# Patient Record
Sex: MALE | Race: WHITE | NOT HISPANIC OR LATINO | Employment: OTHER | URBAN - METROPOLITAN AREA
[De-identification: names, ages, dates, MRNs, and addresses within clinical notes are randomized per-mention and may not be internally consistent; named-entity substitution may affect disease eponyms.]

---

## 2017-07-07 PROBLEM — D64.9 ANEMIA: Status: ACTIVE | Noted: 2017-07-07

## 2017-07-07 PROBLEM — T56.0X1A: Status: ACTIVE | Noted: 2017-07-07

## 2017-07-07 PROBLEM — E66.9 OBESITY (BMI 30-39.9): Status: ACTIVE | Noted: 2017-07-07

## 2017-07-07 PROBLEM — M1A.10X0: Status: ACTIVE | Noted: 2017-07-07

## 2017-07-25 ENCOUNTER — TELEPHONE (OUTPATIENT)
Dept: CARDIOLOGY | Facility: MEDICAL CENTER | Age: 72
End: 2017-07-25

## 2017-07-25 DIAGNOSIS — I15.9 SECONDARY HYPERTENSION: ICD-10-CM

## 2017-07-25 DIAGNOSIS — E78.5 HYPERLIPIDEMIA, UNSPECIFIED HYPERLIPIDEMIA TYPE: ICD-10-CM

## 2017-07-25 DIAGNOSIS — R00.2 PALPITATIONS: ICD-10-CM

## 2017-07-25 NOTE — TELEPHONE ENCOUNTER
Has labs from June but is due for echo - orders placed and message sent to scheduling to get patient scheduled before 8/9 f/u with EC

## 2017-07-25 NOTE — TELEPHONE ENCOUNTER
----- Message from Yaquelin Aguirre [facility] sent at 7/25/2017  1:02 PM PDT -----  Regarding: RE: Surgical clearance needed asap  No pt hasn't been seen since 2015 scheduled appt and [facility] to let [facility] know.  I think I left a message on your voicemail.      ----- Message -----     From: Shahzad Erwin     Sent: 7/25/2017  12:06 PM       To: Easton Brambila Ass't  Subject: FW: Surgical clearance needed asap               Did he sign this?     ----- Message -----     From: Ellen Nicole     Sent: 7/25/2017   9:33 AM       To: Shahzad Erwin  Subject: Surgical clearance needed asap                   [facility]/Breezy    Please call Nay at [facility] at [phone] asap. Patient is scheduled for Colonoscopy on Thursday, 7//27 and she needs to find out today about holding patient's Xarelto for 2 days.

## 2017-08-02 ENCOUNTER — PATIENT OUTREACH (OUTPATIENT)
Dept: HEALTH INFORMATION MANAGEMENT | Facility: OTHER | Age: 72
End: 2017-08-02

## 2017-08-02 NOTE — PROGRESS NOTES
Attempt #:1    WebIZ Checked & Epic Updated: no  HealthConnect Verified: no  Verify PCP: yes    Communication Preference Obtained: yes     Review Care Team: yes    Annual Wellness Visit Scheduling  1. Scheduling Status already scheduled        Care Gap Scheduling (Attempt to Schedule EACH Overdue Care Gap!)     Health Maintenance Due   Topic Date Due   • IMM DTaP/Tdap/Td Vaccine (1 - Tdap) cvmc   • IMM ZOSTER VACCINE  cvmc   • COLONOSCOPY  cvmc         MyChart Activation: declined  MyChart Estephania: no  Virtual Visits: no  Opt In to Text Messages: no

## 2017-08-09 ENCOUNTER — OFFICE VISIT (OUTPATIENT)
Dept: CARDIOLOGY | Facility: CLINIC | Age: 72
End: 2017-08-09
Payer: MEDICARE

## 2017-08-09 VITALS
HEART RATE: 56 BPM | BODY MASS INDEX: 33.79 KG/M2 | DIASTOLIC BLOOD PRESSURE: 84 MMHG | HEIGHT: 70 IN | OXYGEN SATURATION: 92 % | SYSTOLIC BLOOD PRESSURE: 132 MMHG | WEIGHT: 236 LBS

## 2017-08-09 DIAGNOSIS — Z79.01 CHRONIC ANTICOAGULATION: ICD-10-CM

## 2017-08-09 DIAGNOSIS — Z01.810 PRE-OPERATIVE CARDIOVASCULAR EXAMINATION: ICD-10-CM

## 2017-08-09 DIAGNOSIS — I27.20 PULMONARY HTN (HCC): ICD-10-CM

## 2017-08-09 DIAGNOSIS — E78.5 HYPERLIPIDEMIA, UNSPECIFIED HYPERLIPIDEMIA TYPE: ICD-10-CM

## 2017-08-09 DIAGNOSIS — R06.09 DOE (DYSPNEA ON EXERTION): ICD-10-CM

## 2017-08-09 DIAGNOSIS — D50.9 IRON DEFICIENCY ANEMIA, UNSPECIFIED IRON DEFICIENCY ANEMIA TYPE: ICD-10-CM

## 2017-08-09 DIAGNOSIS — I48.19 PERSISTENT ATRIAL FIBRILLATION (HCC): ICD-10-CM

## 2017-08-09 DIAGNOSIS — I10 ESSENTIAL HYPERTENSION: ICD-10-CM

## 2017-08-09 DIAGNOSIS — R00.2 PALPITATIONS: ICD-10-CM

## 2017-08-09 LAB — EKG IMPRESSION: NORMAL

## 2017-08-09 PROCEDURE — 99214 OFFICE O/P EST MOD 30 MIN: CPT | Performed by: INTERNAL MEDICINE

## 2017-08-09 PROCEDURE — 93000 ELECTROCARDIOGRAM COMPLETE: CPT | Performed by: INTERNAL MEDICINE

## 2017-08-09 NOTE — MR AVS SNAPSHOT
"        Michael Carrero   2017 8:20 AM   Office Visit   MRN: [id number]    Department:  [facility]   Dept Phone:  [phone]    Description:  Male : 1945   Provider:  Nani Carr MD,[facility]           Reason for Visit     Follow-Up           Allergies as of 2017     No Known Allergies      You were diagnosed with     Pre-operative cardiovascular examination   [V72.81.ICD-9-CM]       Hyperlipidemia, unspecified hyperlipidemia type   [6164849]       Palpitations   [785.1.ICD-9-CM]       PHILLIPS (dyspnea on exertion)   [484038]       Pulmonary HTN (CMS-HCC)   [812796]       Persistent atrial fibrillation (CMS-HCC)   [879971]       Chronic anticoagulation   [789377]       Essential hypertension   [8346890]       Iron deficiency anemia, unspecified iron deficiency anemia type   [1242595]         Vital Signs     Blood Pressure Pulse Height Weight Body Mass Index Oxygen Saturation    132/84 mmHg 56 1.778 m (5' 10\") 107.049 kg (236 lb) 33.86 kg/m2 92%    Smoking Status                   Former Smoker           Basic Information     Date Of Birth Sex Race Ethnicity Preferred Language    1945 Male White Non- English      Your appointments     2017  8:00 AM   FOLLOW UP with [facility] Carr MD,[facility] ([facility])    [address] [place] [id number] [phone]              Problem List              ICD-10-CM Priority Class Noted - Resolved    HTN (hypertension) I10   2013 - Present    Hyperlipidemia E78.5   2013 - Present    Prostate cancer screening Z12.5   2013 - Present    Palpitations R00.2   2013 - Present    Skin abnormality L98.9   10/23/2013 - Present    Colon cancer screening Z12.11   2013 - Present    Hypokalemia E87.6   2014 - Present    Abnormal EKG R94.31   2014 - Present    Left knee pain M25.562   9/15/2014 - Present    Gout M10.9   9/15/2014 - Present   " Abdominal pain R10.9   1/5/2015 - Present    Atrial fibrillation (CMS-HCC) I48.91   5/26/2015 - Present    Anemia D64.9   7/7/2017 - Present    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, M1A.10X0   7/7/2017 - Present    Obesity (BMI 30-39.9) E66.9   7/7/2017 - Present      Health Maintenance        Date Due Completion Dates    IMM DTaP/Tdap/Td Vaccine (1 - Tdap) 8/18/1964 ---    IMM ZOSTER VACCINE 8/18/2005 ---    COLONOSCOPY 6/1/2017 6/1/2007 (N/S)    Override on 6/1/2007: (N/S)    IMM INFLUENZA (1) 9/1/2017 10/1/2016, 1/5/2015, 1/1/2013            Results       Current Immunizations     13-VALENT PCV PREVNAR 7/5/2016  8:06 AM    Influenza TIV (IM) 1/1/2013    Influenza Vaccine Adult HD 10/1/2016    Influenza Vaccine Quad Inj (Preserved) 1/5/2015    Pneumococcal polysaccharide vaccine (PPSV-23) 6/17/2013      Below and/or attached are the medications your provider expects you to take. Review all of your home medications and newly ordered medications with your provider and/or pharmacist. Follow medication instructions as directed by your provider and/or pharmacist. Please keep your medication list with you and share with your provider. Update the information when medications are discontinued, doses are changed, or new medications (including over-the-counter products) are added; and carry medication information at all times in the event of emergency situations     Allergies:  No Known Allergies          Medications  Valid as of: August 09, 2017 -  8:46 AM    Generic Name Brand Name Tablet Size Instructions for use    Allopurinol (Tab) ZYLOPRIM 300 MG TAKE 1 TABLET DAILY        Carvedilol (Tab) COREG 25 MG TAKE 1 TABLET TWICE A DAY WITH MEALS        Colesevelam HCl (Tab) WELCHOL 625 MG TAKE 3 TABLETS TWICE A DAY WITH MEALS        Fenofibrate (Tab) TRICOR 145 MG TAKE 1 TABLET DAILY        Furosemide (Tab) LASIX 20 MG TAKE 1 TABLET TWICE A DAY        Ibuprofen (Tab) MOTRIN 800 MG TAKE 1 TABLET EVERY 6 HOURS AS  NEEDED        Oxycodone-Acetaminophen (Tab) PERCOCET 5-325 MG Take 1-2 Tabs by mouth every four hours as needed for Severe Pain.        Potassium Chloride Brianda CR (Tab CR) Kdur 20 MEQ TAKE 1 TABLET DAILY        Rivaroxaban (Tab) XARELTO 15 MG TAKE 1 TABLET WITH DINNER        Rosuvastatin Calcium (Tab) CRESTOR 5 MG TAKE 1 TABLET EVERY EVENING        Valsartan-Hydrochlorothiazide (Tab) DIOVAN--25 MG TAKE 1 TABLET DAILY        .                 Medicines prescribed today were sent to:     [facility] - [place], [place] - [address] [facility]    [address] [facility] [address]    Phone: [phone] Fax: [phone]    Open 24 Hours?: No    [id number] - [place], [place] - [id number] [id number]  [id number] [facility] [address]    Phone: [phone] Fax: [phone]    Open 24 Hours?: No      Medication refill instructions:       If your prescription bottle indicates you have medication refills left, it is not necessary to call your provider’s office. Please contact your pharmacy and they will refill your medication.    If your prescription bottle indicates you do not have any refills left, you may request refills at any time through one of the following ways: The online [employer] system (except Urgent Care), by calling your provider’s office, or by asking your pharmacy to contact your provider’s office with a refill request. Medication refills are processed only during regular business hours and may not be available until the next business day. Your provider may request additional information or to have a follow-up visit with you prior to refilling your medication.   *Please Note: Medication refills are assigned a new Rx number when refilled electronically. Your pharmacy may indicate that no refills were authorized even though a new prescription for the same medication is available at the pharmacy. Please request the medicine by name with the  pharmacy before contacting your provider for a refill.           MyChart Status: Patient Declined

## 2017-08-09 NOTE — PROGRESS NOTES
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 07/07/2017   • Obesity (BMI 30-39.9) 07/07/2017   • Atrial fibrillation (CMS-HCC) 05/26/2015   • Abdominal pain 01/05/2015   • Left knee pain 09/15/2014   • Gout 09/15/2014   • Abnormal EKG 06/11/2014   • Hypokalemia 06/04/2014   • Colon cancer screening 12/11/2013   • Skin abnormality 10/23/2013   • HTN (hypertension) 06/17/2013   • Hyperlipidemia 06/17/2013   • Prostate cancer screening 06/17/2013   • Palpitations 06/17/2013       Past Medical History   Diagnosis Date   • History of 24 hour EKG monitoring 2010   • H/O chest x-ray 2010   • 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 at [facility]     Social History     Social History   • Marital Status:      Spouse Name: N/A   • Number of Children: N/A   • Years of Education: N/A     Social History Main Topics   • Smoking status: Former Smoker     Types: Cigarettes   • Smokeless tobacco: Never Used   • Alcohol Use: 0.0 oz/week     0 Standard drinks or equivalent per week   • Drug Use: No   • Sexual Activity: Not Asked     Other Topics Concern   • None     Social History Narrative      History reviewed. No pertinent family history.    Current Outpatient Prescriptions   Medication Sig Dispense Refill   • rosuvastatin (CRESTOR) 5 MG Tab TAKE 1 TABLET EVERY EVENING 90 Tab 3   • WELCHOL 625 MG Tab TAKE 3 TABLETS TWICE A DAY WITH MEALS 540 Tab 1   • furosemide (LASIX) 20 MG Tab TAKE 1 TABLET TWICE A  Tab 2   • ibuprofen (MOTRIN) 800 MG Tab TAKE 1 TABLET EVERY 6 HOURS AS NEEDED 270 Tab 2   • potassium chloride SA (KDUR) 20 MEQ Tab CR TAKE 1 TABLET DAILY 90 Tab 2   • XARELTO 15 MG Tab tablet TAKE 1 TABLET WITH DINNER 90 Tab 3   • carvedilol (COREG) 25 MG Tab TAKE 1 TABLET TWICE A DAY WITH MEALS 180 Tab 3   • valsartan-hydrochlorothiazide (DIOVAN-HCT) 320-25 MG per tablet TAKE 1 TABLET DAILY 90 Tab 3   • allopurinol (ZYLOPRIM) 300 MG Tab TAKE 1 TABLET DAILY 90 Tab 3   • fenofibrate (TRICOR) 145 MG Tab TAKE 1 TABLET DAILY 90 Tab 3   • oxycodone-acetaminophen (PERCOCET) 5-325 MG TABS Take 1-2 Tabs by mouth every four hours as needed for Severe Pain. 30 Tab 0     No current facility-administered medications for this visit.     Review of patient's allergies indicates no known allergies.    Review of Systems:     Constitutional: Denies fevers, Denies weight changes  Eyes: Denies changes in vision, no eye pain  Ears/Nose/Throat/Mouth: Denies nasal congestion or sore throat   Cardiovascular: Denies chest pain or palpitations   Respiratory: PIHLLIPS,  shortness of breath , Denies cough  Gastrointestinal/Hepatic: Denies abdominal pain, nausea, vomiting, diarrhea, constipation or GI bleeding   Genitourinary: Denies bladder dysfunction, dysuria or frequency  Musculoskeletal/Rheum: Denies  joint pain and swelling   Skin/Breast: Denies rash, denies breast lumps or discharge  Neurological: Denies headache, confusion, memory loss or focal weakness/parasthesias  Psychiatric: denies mood disorder   Endocrine: denies hx of diabetes or thyroid dysfunction  Heme/Oncology/Lymph Nodes: Denies enlarged lymph nodes, denies  "brusing or known bleeding disorder  Allergic/Immunologic: Denies hx of allergies      All other systems were reviewed and are negative (AMA/CMS criteria)      Blood pressure 132/84, pulse 56, height 1.778 m (5' 10\"), weight 107.049 kg (236 lb), SpO2 92 %.  General Appearance:  Obese;  Well developed, Well nourished, No acute distress, Non-toxic appearance.   HENT:  Normocephalic, Atraumatic, Oropharynx moist mucous membranes, Dentition: , Nose normal.    Eyes:   EOMI, Conjunctiva normal, No discharge. RT blind  Neck:  Normal range of motion, No cervical tenderness, Supple, No stridor, No JVD .  No thyromegaly.  No carotid bruit.  Cardiovascular:  Normal heart rate, IRR rhythm, NL but distant S1, S2, no S3,but S4; No gallops;  No rubs, 2/6 MALIK at MLSB; .   Extremitites with intact distal pulses, no  clubbing or edema.  No heaves, thrills, HJR;  Peripheral pulses: carotid 2+, brachial 2+, radial 2+, ulnar 2+, femoral 1+, popliteal 1+, PT 0.5+, DP 0.5+;  Lungs:  Respiratory effort is normal. Diminished breath sounds, breath sounds clear to auscultation bilaterally,  no rales, no rhonchi, no wheezing.   Abdomen: Large; Bowel sounds normal, Soft, No tenderness, No guarding, No rebound, No masses, No hepatosplenomegaly.  Skin: Warm, Dry, No erythema, No rash, no induration or crepitus. Multiple Lipofibroma.  Neurologic: Alert & oriented x 3, Normal motor function, Normal sensory function, No focal deficits noted, cranial nerves II through XII are normal,  normal gait.  Psychiatric: Affect normal, Judgment normal, Mood normal.    Results for AMI MICHAELS (MRN [id number]) as of 8/9/2017 08:13   Ref. Range 6/15/2017 07:05 7/7/2017 08:35 7/12/2017 17:41   WBC Latest Ref Range: 4.8-10.8 K/uL 8.3     RBC Latest Ref Range: 4.70-6.10 M/uL 5.21     Hemoglobin Latest Ref Range: 14.0-18.0 g/dL 11.7 (L)     Hematocrit Latest Ref Range: 42.0-52.0 % 40.7 (L)     MCV Latest Ref Range: 80.0-94.0 fL 78.1 (L)     MCH Latest Ref Range: " 27.0-31.0 pg 22.5 (L)     MCHC Latest Ref Range: 33.0-37.0 g/dL 28.7 (L)     RDW Latest Ref Range: 11.5-14.5 % 18.8 (H)     Platelet Count Latest Ref Range: 130-400 K/uL 292     MPV Latest Ref Range: 7.4-10.4 fL 9.7     Sodium Latest Ref Range: 136-145 mmol/L 140     Potassium Latest Ref Range: 3.5-5.1 mmol/L 4.0     Chloride Latest Ref Range:  mmol/L 104     Co2 Latest Ref Range: 21-32 mmol/L 25     Anion Gap Latest Ref Range: 10-18 mmol/L 15     Glucose Latest Ref Range: 74-99 mg/dL 106 (H)     Bun Latest Ref Range: 7-18 mg/dL 23 (H)     Creatinine Latest Ref Range: 0.8-1.3 mg/dL 1.4 (H)     GFR If  Latest Ref Range: >60 mL/min/1.73 m 2 >60     GFR If Non  Latest Ref Range: >60 mL/min/1.73 m 2 50 (A)     Calcium Latest Ref Range: 8.5-11.0 mg/dL 9.7     AST(SGOT) Latest Ref Range: 15-37 U/L 23     ALT(SGPT) Latest Ref Range: 12-78 U/L 20     Alkaline Phosphatase Latest Ref Range:  U/L 23 (L)     Total Bilirubin Latest Ref Range: 0.2-1.0 mg/dL 0.5     Albumin Latest Ref Range: 3.4-5.0 g/dL 4.0     Total Protein Latest Ref Range: 6.4-8.2 g/dL 7.6     A-G Ratio Unknown 1.1     Uric Acid Latest Ref Range: 3.5-8.5 mg/dL  5.1    Iron Latest Ref Range:  ug/dL  45 (L)    Total Iron Binding Latest Ref Range: 250-450 ug/dL  620 (H)    % Saturation Latest Ref Range: 20-55 %  7 (L)    Cholesterol,Tot Latest Ref Range: 120-200 mg/dL 141     Triglycerides Latest Ref Range: 0-150 mg/dL 184 (H)     HDL Latest Ref Range: 40.0-60.0 mg/dL 26.0 (L)     Non HDL Cholesterol Latest Ref Range:   115     LDL Latest Ref Range: <100 mg/dL 78     Chol-Hdl Ratio Unknown 5.42     POC Fecal Occult Blood Latest Ref Range: Negative    1   Vitamin B12 -True Cobalamin Latest Ref Range: 211-911 pg/mL  551    Ferritin Latest Ref Range: 22.0-322.0 ng/mL  11.6 (L)    Folate -Folic Acid Latest Ref Range: >3.1 ng/mL  >24.00    Prostatic Specific Antigen Tot Latest Ref Range: 0.00-4.00 ng/mL 2.58        6/2014 Stress test:  1.  No evidence for Lexiscan induced ischemia or infarction.  2. Mild hypokinesia involving the anterior, lateral and septal walls with global reduced wall thickening.  3.  Ejection fraction 44%.    Assessment and Plan.   71 y.o. male has Echo showing LVEF 55% with RVSP 66 mmHg, mild AR, MR and mild to moderate TR; Reviewed and discussed;  He is low CV risk but persistent AFib on Xarelto for colonoscopy; Rec: Off Xarelto for 2 ds for procedure on 2nd day, possible LMWH, Lovenox,  for coverage for bridging, 1mg/Kg Sub Q bid;     1. Pre-operative cardiovascular examination  See above    2. Hyperlipidemia, unspecified hyperlipidemia type  recheck    3. Palpitations  gone    4. PHILLIPS (dyspnea on exertion)  stable    5. Pulmonary HTN (CMS-HCC)  Reviewed and to follow; Long discussion about possible sx's;     6. Persistent atrial fibrillation (CMS-HCC)  discussed    7. Chronic anticoagulation  discussed    8. Essential hypertension  controlled    9. Iron deficiency anemia, unspecified iron deficiency anemia type  See w/u and endoscopy      Return to clinic in  1  months    1. Pre-operative cardiovascular examination     2. Hyperlipidemia, unspecified hyperlipidemia type     3. Palpitations     4. PHILLIPS (dyspnea on exertion)     5. Pulmonary HTN (CMS-HCC)     6. Persistent atrial fibrillation (CMS-HCC)     7. Chronic anticoagulation     8. Essential hypertension     9. Iron deficiency anemia, unspecified iron deficiency anemia type

## 2017-08-09 NOTE — Clinical Note
[facility] [place] [address],   [address]nd Floor  [place] [address]  Phone: [phone]  Fax: [phone]              Michael Carrero  1945    Encounter Date: 8/9/2017    Nolberto Lou M.D.    Thank you for the referral. I had the pleasure of seeing Michael Carrero today in cardiology clinic. I've attached my visit note below. If you have any questions please feel free to give me a call anytime.      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 07/07/2017   • Obesity (BMI 30-39.9) 07/07/2017   • Atrial fibrillation (CMS-HCC) 05/26/2015   • Abdominal pain 01/05/2015   • Left knee pain 09/15/2014   • Gout 09/15/2014   • Abnormal EKG 06/11/2014   • Hypokalemia 06/04/2014   • Colon cancer screening 12/11/2013   • Skin abnormality 10/23/2013   • HTN (hypertension) 06/17/2013   • Hyperlipidemia 06/17/2013   • Prostate cancer screening 06/17/2013   • Palpitations 06/17/2013       Past Medical History   Diagnosis Date   • History of 24 hour EKG monitoring 2010   • H/O chest x-ray 2010   • 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 at SUR[facility]ERY [facility]     Social History     Social History   • Marital Status:      Spouse Name: N/A   • Number of Children: N/A   • Years of Education: N/A     Social History Main Topics   • Smoking status: Former Smoker     Types: Cigarettes   • Smokeless tobacco: Never Used   • Alcohol Use: 0.0 oz/week     0 Standard drinks or equivalent per week   • Drug Use: No   • Sexual Activity: Not Asked     Other Topics Concern   • None     Social History Narrative     History reviewed. No pertinent family history.    Current Outpatient Prescriptions   Medication Sig Dispense Refill   • rosuvastatin (CRESTOR) 5 MG Tab TAKE 1 TABLET EVERY EVENING 90 Tab 3   • WELCHOL 625 MG Tab TAKE 3 TABLETS TWICE A DAY WITH MEALS 540 Tab 1   • furosemide (LASIX) 20 MG Tab TAKE 1 TABLET TWICE A  Tab 2   • ibuprofen (MOTRIN) 800 MG Tab TAKE 1 TABLET EVERY 6 HOURS AS NEEDED 270 Tab 2   • potassium chloride SA (KDUR) 20 MEQ Tab CR TAKE 1 TABLET DAILY 90 Tab 2   • XARELTO 15 MG Tab tablet TAKE 1 TABLET WITH DINNER 90 Tab 3   • carvedilol (COREG) 25 MG Tab TAKE 1 TABLET TWICE A DAY WITH MEALS 180 Tab 3   • valsartan-hydrochlorothiazide (DIOVAN-HCT) 320-25 MG per tablet TAKE 1 TABLET DAILY 90 Tab 3   • allopurinol (ZYLOPRIM) 300 MG Tab TAKE 1 TABLET DAILY 90 Tab 3   • fenofibrate (TRICOR) 145 MG Tab TAKE 1 TABLET DAILY 90 Tab 3   • oxycodone-acetaminophen (PERCOCET) 5-325 MG TABS Take 1-2 Tabs by mouth every four hours as needed for Severe Pain. 30 Tab 0     No current facility-administered medications for this visit.     Review of patient's allergies indicates no known allergies.    Review of Systems:     Constitutional: Denies fevers, Denies weight changes  Eyes: Denies changes in vision, no eye pain  Ears/Nose/Throat/Mouth: Denies nasal congestion or  "sore throat   Cardiovascular: Denies chest pain or palpitations   Respiratory: PHILLIPS,  shortness of breath , Denies cough  Gastrointestinal/Hepatic: Denies abdominal pain, nausea, vomiting, diarrhea, constipation or GI bleeding   Genitourinary: Denies bladder dysfunction, dysuria or frequency  Musculoskeletal/Rheum: Denies  joint pain and swelling   Skin/Breast: Denies rash, denies breast lumps or discharge  Neurological: Denies headache, confusion, memory loss or focal weakness/parasthesias  Psychiatric: denies mood disorder   Endocrine: denies hx of diabetes or thyroid dysfunction  Heme/Oncology/Lymph Nodes: Denies enlarged lymph nodes, denies brusing or known bleeding disorder  Allergic/Immunologic: Denies hx of allergies      All other systems were reviewed and are negative (AMA/CMS criteria)      Blood pressure 132/84, pulse 56, height 1.778 m (5' 10\"), weight 107.049 kg (236 lb), SpO2 92 %.  General Appearance:  Obese;  Well developed, Well nourished, No acute distress, Non-toxic appearance.   HENT:  Normocephalic, Atraumatic, Oropharynx moist mucous membranes, Dentition: , Nose normal.    Eyes:   EOMI, Conjunctiva normal, No discharge. RT blind  Neck:  Normal range of motion, No cervical tenderness, Supple, No stridor, No JVD .  No thyromegaly.  No carotid bruit.  Cardiovascular:  Normal heart rate, IRR rhythm, NL but distant S1, S2, no S3,but S4; No gallops;  No rubs, 2/6 MALIK at MLSB; .   Extremitites with intact distal pulses, no  clubbing or edema.  No heaves, thrills, HJR;  Peripheral pulses: carotid 2+, brachial 2+, radial 2+, ulnar 2+, femoral 1+, popliteal 1+, PT 0.5+, DP 0.5+;  Lungs:  Respiratory effort is normal. Diminished breath sounds, breath sounds clear to auscultation bilaterally,  no rales, no rhonchi, no wheezing.   Abdomen: Large; Bowel sounds normal, Soft, No tenderness, No guarding, No rebound, No masses, No hepatosplenomegaly.  Skin: Warm, Dry, No erythema, No rash, no induration or " crepitus. Multiple Lipofibroma.  Neurologic: Alert & oriented x 3, Normal motor function, Normal sensory function, No focal deficits noted, cranial nerves II through XII are normal,  normal gait.  Psychiatric: Affect normal, Judgment normal, Mood normal.    Results for AMI MICHAELS (MRN [id number]) as of 8/9/2017 08:13   Ref. Range 6/15/2017 07:05 7/7/2017 08:35 7/12/2017 17:41   WBC Latest Ref Range: 4.8-10.8 K/uL 8.3     RBC Latest Ref Range: 4.70-6.10 M/uL 5.21     Hemoglobin Latest Ref Range: 14.0-18.0 g/dL 11.7 (L)     Hematocrit Latest Ref Range: 42.0-52.0 % 40.7 (L)     MCV Latest Ref Range: 80.0-94.0 fL 78.1 (L)     MCH Latest Ref Range: 27.0-31.0 pg 22.5 (L)     MCHC Latest Ref Range: 33.0-37.0 g/dL 28.7 (L)     RDW Latest Ref Range: 11.5-14.5 % 18.8 (H)     Platelet Count Latest Ref Range: 130-400 K/uL 292     MPV Latest Ref Range: 7.4-10.4 fL 9.7     Sodium Latest Ref Range: 136-145 mmol/L 140     Potassium Latest Ref Range: 3.5-5.1 mmol/L 4.0     Chloride Latest Ref Range:  mmol/L 104     Co2 Latest Ref Range: 21-32 mmol/L 25     Anion Gap Latest Ref Range: 10-18 mmol/L 15     Glucose Latest Ref Range: 74-99 mg/dL 106 (H)     Bun Latest Ref Range: 7-18 mg/dL 23 (H)     Creatinine Latest Ref Range: 0.8-1.3 mg/dL 1.4 (H)     GFR If  Latest Ref Range: >60 mL/min/1.73 m 2 >60     GFR If Non  Latest Ref Range: >60 mL/min/1.73 m 2 50 (A)     Calcium Latest Ref Range: 8.5-11.0 mg/dL 9.7     AST(SGOT) Latest Ref Range: 15-37 U/L 23     ALT(SGPT) Latest Ref Range: 12-78 U/L 20     Alkaline Phosphatase Latest Ref Range:  U/L 23 (L)     Total Bilirubin Latest Ref Range: 0.2-1.0 mg/dL 0.5     Albumin Latest Ref Range: 3.4-5.0 g/dL 4.0     Total Protein Latest Ref Range: 6.4-8.2 g/dL 7.6     A-G Ratio Unknown 1.1     Uric Acid Latest Ref Range: 3.5-8.5 mg/dL  5.1    Iron Latest Ref Range:  ug/dL  45 (L)    Total Iron Binding Latest Ref Range: 250-450 ug/dL  620 (H)       % Saturation Latest Ref Range: 20-55 %  7 (L)    Cholesterol,Tot Latest Ref Range: 120-200 mg/dL 141     Triglycerides Latest Ref Range: 0-150 mg/dL 184 (H)     HDL Latest Ref Range: 40.0-60.0 mg/dL 26.0 (L)     Non HDL Cholesterol Latest Ref Range:   115     LDL Latest Ref Range: <100 mg/dL 78     Chol-Hdl Ratio Unknown 5.42     POC Fecal Occult Blood Latest Ref Range: Negative    1   Vitamin B12 -True Cobalamin Latest Ref Range: 211-911 pg/mL  551    Ferritin Latest Ref Range: 22.0-322.0 ng/mL  11.6 (L)    Folate -Folic Acid Latest Ref Range: >3.1 ng/mL  >24.00    Prostatic Specific Antigen Tot Latest Ref Range: 0.00-4.00 ng/mL 2.58       6/2014 Stress test:  1.  No evidence for Lexiscan induced ischemia or infarction.  2. Mild hypokinesia involving the anterior, lateral and septal walls with global reduced wall thickening.  3.  Ejection fraction 44%.    Assessment and Plan.   71 y.o. male has Echo showing LVEF 55% with RVSP 66 mmHg, mild AR, MR and mild to moderate TR; Reviewed and discussed;  He is low CV risk but persistent AFib on Xarelto for colonoscopy; Rec: Off Xarelto for 2 ds for procedure on 2nd day, possible LMWH, Lovenox,  for coverage for bridging, 1mg/Kg Sub Q bid;     1. Pre-operative cardiovascular examination  See above    2. Hyperlipidemia, unspecified hyperlipidemia type  recheck    3. Palpitations  gone    4. PHILLIPS (dyspnea on exertion)  stable    5. Pulmonary HTN (CMS-[facility])  Reviewed and to follow; Long discussion about possible sx's;     6. Persistent atrial fibrillation (CMS-HCC)  discussed    7. Chronic anticoagulation  discussed    8. Essential hypertension  controlled    9. Iron deficiency anemia, unspecified iron deficiency anemia type  See w/u and endoscopy      Return to clinic in  1  months    1. Pre-operative cardiovascular examination     2. Hyperlipidemia, unspecified hyperlipidemia type     3. Palpitations     4. PHILLIPS (dyspnea on exertion)     5. Pulmonary HTN (CMS-HCC)     6.  Persistent atrial fibrillation (CMS-HCC)     7. Chronic anticoagulation     8. Essential hypertension     9. Iron deficiency anemia, unspecified iron deficiency anemia type               Nolberto Lou M.D.  [address] [place] [address] [address]  [facility] [address]  VIA In Basket

## 2017-09-13 ENCOUNTER — TELEPHONE (OUTPATIENT)
Dept: CARDIOLOGY | Facility: MEDICAL CENTER | Age: 72
End: 2017-09-13

## 2017-09-13 NOTE — TELEPHONE ENCOUNTER
Patient is having a colonoscopy tomorrow.  He stopped Xarelto yesterday.  He is now at the infusion center in [place] awaiting administration of his first shot of Lovenox.  I was consulted as they did not have orders from our office.  I called Cruzito at [facility] and he informed me of the high risk of the patient.  I had him speak with Dr. Carr and the Lovenox was discontinued.  I notified the Infusion Center.    I have asked the Infusion Center nurse to have patient let us know Friday if he has resumed Lovenox or not.

## 2017-09-13 NOTE — TELEPHONE ENCOUNTER
----- Message from Ellen Nicole sent at 9/13/2017  8:54 AM PDT -----  Regarding: [facility] needs call back asap  [place]    Please call Cynthia pre-op nurse at [facility], at [phone] asap. Patient has stopped taking Xarelto for upcoming surgery and she needs to find out about bridging with Lovenox. Patient is with her now.

## 2018-02-09 PROBLEM — R31.29 OTHER MICROSCOPIC HEMATURIA: Status: ACTIVE | Noted: 2018-02-09

## 2018-02-16 PROBLEM — N28.1 ACQUIRED BILATERAL RENAL CYSTS: Status: ACTIVE | Noted: 2018-02-16
